# Patient Record
Sex: MALE | Race: WHITE | NOT HISPANIC OR LATINO | URBAN - NONMETROPOLITAN AREA
[De-identification: names, ages, dates, MRNs, and addresses within clinical notes are randomized per-mention and may not be internally consistent; named-entity substitution may affect disease eponyms.]

---

## 2021-03-26 ENCOUNTER — IMMUNIZATIONS (OUTPATIENT)
Dept: FAMILY MEDICINE CLINIC | Facility: HOSPITAL | Age: 51
End: 2021-03-26

## 2021-03-26 DIAGNOSIS — Z23 ENCOUNTER FOR IMMUNIZATION: Primary | ICD-10-CM

## 2021-03-26 PROCEDURE — 0011A SARS-COV-2 / COVID-19 MRNA VACCINE (MODERNA) 100 MCG: CPT

## 2021-03-26 PROCEDURE — 91301 SARS-COV-2 / COVID-19 MRNA VACCINE (MODERNA) 100 MCG: CPT

## 2021-04-28 ENCOUNTER — IMMUNIZATIONS (OUTPATIENT)
Dept: FAMILY MEDICINE CLINIC | Facility: HOSPITAL | Age: 51
End: 2021-04-28

## 2021-04-28 DIAGNOSIS — Z23 ENCOUNTER FOR IMMUNIZATION: Primary | ICD-10-CM

## 2021-04-28 PROCEDURE — 91301 SARS-COV-2 / COVID-19 MRNA VACCINE (MODERNA) 100 MCG: CPT

## 2021-04-28 PROCEDURE — 0012A SARS-COV-2 / COVID-19 MRNA VACCINE (MODERNA) 100 MCG: CPT

## 2023-04-26 ENCOUNTER — OFFICE VISIT (OUTPATIENT)
Dept: PODIATRY | Age: 53
End: 2023-04-26

## 2023-04-26 VITALS
HEART RATE: 100 BPM | BODY MASS INDEX: 47.74 KG/M2 | OXYGEN SATURATION: 92 % | DIASTOLIC BLOOD PRESSURE: 110 MMHG | TEMPERATURE: 97.4 F | WEIGHT: 315 LBS | SYSTOLIC BLOOD PRESSURE: 210 MMHG | HEIGHT: 68 IN

## 2023-04-26 DIAGNOSIS — L85.3 XEROSIS CUTIS: ICD-10-CM

## 2023-04-26 DIAGNOSIS — B35.1 ONYCHOMYCOSIS: Primary | ICD-10-CM

## 2023-04-26 DIAGNOSIS — I73.9 PVD (PERIPHERAL VASCULAR DISEASE) (HCC): ICD-10-CM

## 2023-04-26 DIAGNOSIS — E11.59 TYPE 2 DIABETES MELLITUS WITH OTHER CIRCULATORY COMPLICATION, WITHOUT LONG-TERM CURRENT USE OF INSULIN (HCC): ICD-10-CM

## 2023-04-26 RX ORDER — ALPRAZOLAM 0.25 MG/1
TABLET ORAL DAILY PRN
COMMUNITY
Start: 2023-03-09

## 2023-04-26 RX ORDER — IPRATROPIUM BROMIDE AND ALBUTEROL SULFATE 2.5; .5 MG/3ML; MG/3ML
3 SOLUTION RESPIRATORY (INHALATION) 4 TIMES DAILY PRN
COMMUNITY

## 2023-04-26 RX ORDER — ALBUTEROL SULFATE 90 UG/1
AEROSOL, METERED RESPIRATORY (INHALATION)
COMMUNITY
Start: 2023-04-24

## 2023-04-26 RX ORDER — AMMONIUM LACTATE 12 G/100G
CREAM TOPICAL 2 TIMES DAILY
Qty: 385 G | Refills: 3 | Status: SHIPPED | OUTPATIENT
Start: 2023-04-26

## 2023-04-26 RX ORDER — POTASSIUM CHLORIDE 20 MEQ/1
20 TABLET, EXTENDED RELEASE ORAL DAILY
COMMUNITY
Start: 2023-04-24

## 2023-04-26 RX ORDER — LEVOTHYROXINE SODIUM 0.2 MG/1
TABLET ORAL
COMMUNITY
Start: 2023-04-23

## 2023-04-26 RX ORDER — ERGOCALCIFEROL 1.25 MG/1
CAPSULE ORAL
COMMUNITY
Start: 2023-04-14

## 2023-04-26 RX ORDER — LEVOTHYROXINE SODIUM 0.03 MG/1
25 TABLET ORAL DAILY
COMMUNITY

## 2023-04-26 RX ORDER — CLINDAMYCIN AND BENZOYL PEROXIDE 10; 50 MG/G; MG/G
1 GEL TOPICAL 2 TIMES DAILY
COMMUNITY

## 2023-04-26 RX ORDER — CHOLECALCIFEROL (VITAMIN D3) 125 MCG
CAPSULE ORAL
COMMUNITY

## 2023-04-26 RX ORDER — METOPROLOL SUCCINATE 50 MG/1
50 TABLET, EXTENDED RELEASE ORAL EVERY EVENING
COMMUNITY
Start: 2023-04-24

## 2023-04-26 RX ORDER — CLONIDINE HYDROCHLORIDE 0.1 MG/1
0.1 TABLET ORAL DAILY PRN
COMMUNITY
Start: 2023-04-04

## 2023-04-26 RX ORDER — BENZOYL PEROXIDE 50 MG/ML
LIQUID TOPICAL
COMMUNITY
Start: 2023-03-09

## 2023-04-26 RX ORDER — ASPIRIN 81 MG/1
81 TABLET, COATED ORAL DAILY
COMMUNITY
Start: 2023-03-20

## 2023-04-26 RX ORDER — TRAZODONE HYDROCHLORIDE 100 MG/1
100 TABLET ORAL
COMMUNITY

## 2023-04-26 RX ORDER — CEPHALEXIN 500 MG/1
500 CAPSULE ORAL 3 TIMES DAILY
COMMUNITY
Start: 2023-04-24

## 2023-04-26 RX ORDER — PANTOPRAZOLE SODIUM 40 MG/1
TABLET, DELAYED RELEASE ORAL
COMMUNITY
Start: 2023-03-31

## 2023-04-26 RX ORDER — VALSARTAN 80 MG/1
80 TABLET ORAL DAILY
COMMUNITY
Start: 2023-01-17

## 2023-04-26 RX ORDER — IBUPROFEN 600 MG/1
600 TABLET ORAL 3 TIMES DAILY PRN
COMMUNITY
Start: 2023-04-24

## 2023-04-26 RX ORDER — ESCITALOPRAM OXALATE 5 MG/1
5 TABLET ORAL DAILY
COMMUNITY
Start: 2023-02-20

## 2023-04-26 RX ORDER — AMLODIPINE BESYLATE 10 MG/1
10 TABLET ORAL EVERY EVENING
COMMUNITY
Start: 2023-03-09

## 2023-04-26 RX ORDER — HYDROCHLOROTHIAZIDE 25 MG/1
25 TABLET ORAL DAILY
COMMUNITY
Start: 2023-04-16

## 2023-04-26 RX ORDER — CLINDAMYCIN PHOSPHATE 10 UG/ML
1 LOTION TOPICAL DAILY
COMMUNITY
Start: 2023-03-09

## 2023-04-26 RX ORDER — FUROSEMIDE 40 MG/1
40 TABLET ORAL DAILY
COMMUNITY
Start: 2023-04-12

## 2023-04-26 NOTE — PROGRESS NOTES
Assessment/Plan:     Diagnoses and all orders for this visit:    Onychomycosis    PVD (peripheral vascular disease) ([facility] )    Type 2 diabetes mellitus with other circulatory complication, without long-term current use of insulin ([facility])    Xerosis cutis  -     ammonium lactate (LAC-HYDRIN) 12 % cream; Apply topically 2 (two) times a day    Other orders  -     valsartan (DIOVAN) 80 mg tablet; Take 80 mg by mouth daily (Patient not taking: Reported on 4/26/2023)  -     umeclidinium-vilanterol 62 5-25 mcg/actuation inhaler;  (Patient not taking: Reported on 4/26/2023)  -     traZODone (DESYREL) 100 mg tablet; Take 100 mg by mouth (Patient not taking: Reported on 4/26/2023)  -     potassium chloride (K-DUR,KLOR-CON) 20 mEq tablet; Take 20 mEq by mouth daily  -     pantoprazole (PROTONIX) 40 mg tablet; take 1 tablet by mouth every morning 30 MINS BEFORE FIRST MEAL OF THE DAY  DO NOT CUT SPLIT OR CHEW (Patient not taking: Reported on 4/26/2023)  -     metoprolol succinate (TOPROL-XL) 50 mg 24 hr tablet; Take 50 mg by mouth every evening  -     metFORMIN (GLUCOPHAGE) 1000 MG tablet; Take 1,000 mg by mouth 2 (two) times a day  -     levothyroxine 25 mcg tablet; Take 25 mcg by mouth daily  -     levothyroxine 200 mcg tablet  -     ipratropium-albuterol (DUO-NEB) 0 5-2 5 mg/3 mL nebulizer solution; Inhale 3 mL 4 (four) times a day as needed  -     ibuprofen (MOTRIN) 600 mg tablet; Take 600 mg by mouth 3 (three) times a day as needed  -     hydrochlorothiazide (HYDRODIURIL) 25 mg tablet; Take 25 mg by mouth daily (Patient not taking: Reported on 4/26/2023)  -     furosemide (LASIX) 40 mg tablet; Take 40 mg by mouth daily  -     escitalopram (LEXAPRO) 5 mg tablet; Take 5 mg by mouth daily (Patient not taking: Reported on 4/26/2023)  -     ergocalciferol (VITAMIN D2) 50,000 units; take 1 capsule by mouth every week for 12 WEEKS  -     cloNIDine (CATAPRES) 0 1 mg tablet;  Take 0 1 mg by mouth daily as needed  -     clindamycin (CLEOCIN T) 1 % lotion; Apply 1 application  topically daily As directed  -     cephalexin (KEFLEX) 500 mg capsule; Take 500 mg by mouth 3 (three) times a day  -     clindamycin-benzoyl peroxide (BENZACLIN) gel; Apply 1 application  topically 2 (two) times a day  -     Aspirin Low Dose 81 MG EC tablet; Take 81 mg by mouth daily  -     amLODIPine (NORVASC) 10 mg tablet; Take 10 mg by mouth every evening (Patient not taking: Reported on 4/26/2023)  -     benzoyl peroxide 5 % external wash; APPLY LOCALLY DAILY AS NEEDED  -     ALPRAZolam (XANAX) 0 25 mg tablet; Take by mouth daily as needed  -     albuterol (PROVENTIL HFA,VENTOLIN HFA) 90 mcg/act inhaler; inhale 2 puffs by mouth and INTO THE LUNGS every 4 hours if needed  -     Melatonin 5 MG TABS; Take by mouth           IMPRESSION:  · NIDDM, A1c 7 8% 2/15/23  · CHF w/ B/L LE edema  · Onychomycosis  · Xerosis     PLAN:  · I reviewed clinical exam with patient in detail today  I have discussed with the patient the pathophysiology of this diagnosis and reviewed how the examination correlates with this diagnosis  DFE performed today  Patient has significant lower extremity risk due to diminished pulses in the feet and trophic skin changes to the lower extremity including thick toenail, atrophic skin, and decreased hair growth  Debridement of toenails x10 performed today  Using nail nipper, riaz, and curette, nails were sharply debrided, reduced in thickness and length  Devitalized nail tissue and fungal debris excised and removed  Patient tolerated well  Discussed proper shoe gear, daily inspections of feet, and general foot health with patient  Amlactin rx'd for BID use to heels  I discussed that he should follow-up as recommended for his heart issues  I reviewed [facility] visit from 4/17/23 and patient left AMA due to wife (unfortunately she passed)  He was placed on a diuretic and instructed to f/u for further care (further imaging)   I discussed that there are "things that are able to be done to decrease B/L LE edema however he needs to get his heart checked out first  I recommended LE elevation in the meantime  Patient has Q8  findings and is recommended for at risk foot care every 9-10 weeks  Subjective:      Patient ID: Teodoro Luther is a 50 y o  male  Ijeoma Serrato presents to clinic today regarding need for diabetic foot check, nail care and B/L LE including feet edema and pain  Notes pain, edema present for a few months  He recently went to ED for right foot pain and full workup was started for CHF however patient left AMA due to wife not doing well  Notes painful edema to B/L LE  NIDDM  Endorses T/B to B/L LE  The following portions of the patient's history were reviewed and updated as appropriate: allergies, current medications, past family history, past medical history, past social history, past surgical history and problem list     Review of Systems   Constitutional: Negative for activity change, chills and fever  HENT: Negative  Respiratory: Negative for cough, chest tightness and shortness of breath  Cardiovascular: Positive for leg swelling (B/L)  Negative for chest pain  Endocrine: Negative  Genitourinary: Negative  Musculoskeletal: Positive for gait problem (B/L LE pain)  Skin:        dystrophic toenails   Neurological:        T/B to B/L feet   Psychiatric/Behavioral: Negative  Negative for agitation and behavioral problems  Objective:      BP (!) 210/110 (BP Location: Right arm, Patient Position: Sitting, Cuff Size: Standard)   Pulse 100   Temp (!) 97 4 °F (36 3 °C)   Ht 5' 8\" (1 727 m)   Wt (!) 143 kg (315 lb)   SpO2 92%   BMI 47 90 kg/m²          Physical Exam  Cardiovascular:      Pulses: Pulses are weak  Dorsalis pedis pulses are 0 on the right side and 0 on the left side  Posterior tibial pulses are 0 on the right side and 0 on the left side        Comments: B/L LE with edema from below " knee to toes  There are varicosities noted  Feet:      Right foot:      Skin integrity: Dry skin (heels) present  No ulcer or callus  Left foot:      Skin integrity: Dry skin (heels) present  No ulcer or callus  Skin:     Comments: Toenails x10 are elongated, thickened with yellow discoloration and subungual debris  B/L LE skin is thin, dry and shiny  No open wounds however skin is with venous stasis changes  Neurological:      Comments: + T/B to B/L LE           Diabetic Foot Exam    Patient's shoes and socks removed  Right Foot/Ankle   Right Foot Inspection  Skin Exam: skin intact and dry skin (heels)  No callus, no ulcer and no callus  Toe Exam:  no right toe deformity    Sensory   Vibration: diminished  Proprioception: diminished  Monofilament testing: intact    Vascular  Capillary refills: < 3 seconds  The right DP pulse is 0  The right PT pulse is 0  Left Foot/Ankle  Left Foot Inspection  Skin Exam: skin intact and dry skin (heels)  No ulcer and no callus  Toe Exam: No left toe deformity  Sensory   Vibration: diminished  Proprioception: diminished  Monofilament testing: intact    Vascular  Capillary refills: < 3 seconds  The left DP pulse is 0  The left PT pulse is 0       Assign Risk Category  No deformity present  No loss of protective sensation  Weak pulses  Risk: 0

## 2023-05-30 ENCOUNTER — OFFICE VISIT (OUTPATIENT)
Dept: URGENT CARE | Facility: CLINIC | Age: 53
End: 2023-05-30

## 2023-05-30 VITALS
TEMPERATURE: 97.5 F | SYSTOLIC BLOOD PRESSURE: 158 MMHG | WEIGHT: 315 LBS | BODY MASS INDEX: 47.74 KG/M2 | HEIGHT: 68 IN | DIASTOLIC BLOOD PRESSURE: 100 MMHG | RESPIRATION RATE: 20 BRPM | OXYGEN SATURATION: 95 % | HEART RATE: 105 BPM

## 2023-05-30 DIAGNOSIS — S91.115A LACERATION OF FIFTH TOE OF LEFT FOOT, INITIAL ENCOUNTER: Primary | ICD-10-CM

## 2023-05-30 RX ORDER — CEPHALEXIN 500 MG/1
500 CAPSULE ORAL EVERY 8 HOURS SCHEDULED
Qty: 21 CAPSULE | Refills: 0 | Status: SHIPPED | OUTPATIENT
Start: 2023-05-30 | End: 2023-06-06

## 2023-05-30 NOTE — PROGRESS NOTES
[facility]        NAME: Ariella Esteban is a 50 y o  male  : 1970    MRN: [id number]  DATE: May 30, 2023  TIME: 4:29 PM    Assessment and Plan   Laceration of fifth toe of left foot, initial encounter [S91 115A]  1  Laceration of fifth toe of left foot, initial encounter  Tdap Vaccine greater than or equal to 8yo    cephalexin (KEFLEX) 500 mg capsule        Discussed problem with patient  Laceration fifth toe seems well-appearing and prescribing Keflex due to diabetic history as well as peripheral vascular disease history  Discussed that patient has +3 pitting edema as well as syncopal history and advised that he needs follow-up for these issues  Patient reports that he has a PCP appointment with Dr Noris Tavarez tomorrow  Advised to address these concerns with him tomorrow  Advised good wound cleansing using soap and water as well as bacitracin ointment  Patient Instructions       Follow up with PCP in 3-5 days  Proceed to  ER if symptoms worsen  Chief Complaint     Chief Complaint   Patient presents with   • Toe Injury     [place] onto right knee 1 day ago and sustained a laceration to his left 5 th toe  History of Present Illness       51 y/o male presents after a fall the occurred yesterday  Has been pasting out standing up for the last 2 months  Reports patient experienced one of these episodes yesterday and fell onto right knee and suffered a laceration to the left 5th toe  Past medical history of peripheral vascular disease, CHF, type 2 diabetes, COPD, tobacco abuse, hypertension  patient reports 5 out of 10 pain complaints to his right knee but has full range of motion  Walks with a cane at baseline  States he does not drive  Denies any numbness or tingling from the right knee  Patient reports he his compliant on his medications but er report from  states he is not usually compliant        Review of Systems   Review of Systems   Constitutional: Negative for appetite change, chills, fatigue and fever  Eyes: Negative for photophobia and visual disturbance  Respiratory: Negative for cough, shortness of breath, wheezing and stridor  Cardiovascular: Negative for chest pain and palpitations  Skin: Positive for wound  Neurological: Positive for dizziness and light-headedness  Negative for syncope and headaches           Current Medications       Current Outpatient Medications:   •  albuterol (PROVENTIL HFA,VENTOLIN HFA) 90 mcg/act inhaler, inhale 2 puffs by mouth and INTO THE LUNGS every 4 hours if needed, Disp: , Rfl:   •  ALPRAZolam (XANAX) 0 25 mg tablet, Take by mouth daily as needed, Disp: , Rfl:   •  amLODIPine (NORVASC) 10 mg tablet, Take 10 mg by mouth every evening, Disp: , Rfl:   •  ammonium lactate (LAC-HYDRIN) 12 % cream, Apply topically 2 (two) times a day, Disp: 385 g, Rfl: 3  •  Aspirin Low Dose 81 MG EC tablet, Take 81 mg by mouth daily, Disp: , Rfl:   •  benzoyl peroxide 5 % external wash, APPLY LOCALLY DAILY AS NEEDED, Disp: , Rfl:   •  cephalexin (KEFLEX) 500 mg capsule, Take 1 capsule (500 mg total) by mouth every 8 (eight) hours for 7 days, Disp: 21 capsule, Rfl: 0  •  cloNIDine (CATAPRES) 0 1 mg tablet, Take 0 1 mg by mouth daily as needed, Disp: , Rfl:   •  ergocalciferol (VITAMIN D2) 50,000 units, take 1 capsule by mouth every week for 12 WEEKS, Disp: , Rfl:   •  escitalopram (LEXAPRO) 5 mg tablet, Take 5 mg by mouth daily, Disp: , Rfl:   •  furosemide (LASIX) 40 mg tablet, Take 40 mg by mouth daily, Disp: , Rfl:   •  ibuprofen (MOTRIN) 600 mg tablet, Take 600 mg by mouth 3 (three) times a day as needed, Disp: , Rfl:   •  ipratropium-albuterol (DUO-NEB) 0 5-2 5 mg/3 mL nebulizer solution, Inhale 3 mL 4 (four) times a day as needed, Disp: , Rfl:   •  levothyroxine 200 mcg tablet, , Disp: , Rfl:   •  levothyroxine 25 mcg tablet, Take 25 mcg by mouth daily, Disp: , Rfl:   •  Melatonin 5 MG TABS, Take by mouth, Disp: , Rfl:   •  metFORMIN (GLUCOPHAGE) 1000 MG tablet, Take 1,000 mg by mouth 2 (two) times a day, Disp: , Rfl:   •  metoprolol succinate (TOPROL-XL) 50 mg 24 hr tablet, Take 50 mg by mouth every evening, Disp: , Rfl:   •  potassium chloride (K-DUR,KLOR-CON) 20 mEq tablet, Take 20 mEq by mouth daily, Disp: , Rfl:   •  clindamycin (CLEOCIN T) 1 % lotion, Apply 1 application  topically daily As directed, Disp: , Rfl:   •  clindamycin-benzoyl peroxide (BENZACLIN) gel, Apply 1 application  topically 2 (two) times a day, Disp: , Rfl:   •  hydrochlorothiazide (HYDRODIURIL) 25 mg tablet, Take 25 mg by mouth daily (Patient not taking: Reported on 4/26/2023), Disp: , Rfl:   •  pantoprazole (PROTONIX) 40 mg tablet, take 1 tablet by mouth every morning 30 MINS BEFORE FIRST MEAL OF THE DAY  DO NOT CUT SPLIT OR CHEW (Patient not taking: Reported on 4/26/2023), Disp: , Rfl:   •  traZODone (DESYREL) 100 mg tablet, Take 100 mg by mouth (Patient not taking: Reported on 4/26/2023), Disp: , Rfl:   •  umeclidinium-vilanterol 62 5-25 mcg/actuation inhaler, , Disp: , Rfl:   •  valsartan (DIOVAN) 80 mg tablet, Take 80 mg by mouth daily (Patient not taking: Reported on 4/26/2023), Disp: , Rfl:     Current Allergies     Allergies as of 05/30/2023   • (No Known Allergies)            The following portions of the patient's history were reviewed and updated as appropriate: allergies, current medications, past family history, past medical history, past social history, past surgical history and problem list      Past Medical History:   Diagnosis Date   • COPD (chronic obstructive pulmonary disease) ([facility] )    • Diabetes mellitus ([facility] )    • GERD (gastroesophageal reflux disease)    • Hyperlipidemia    • Hypertension        Past Surgical History:   Procedure Laterality Date   • FISTULA REPAIR         Family History   Problem Relation Age of Onset   • Hypertension Mother    • Hypertension Father          Medications have been verified          Objective   /100   Pulse 105   Temp "97 5 °F (36 4 °C)   Resp 20   Ht 5' 8\" (1 727 m)   Wt (!) 143 kg (315 lb)   SpO2 95%   BMI 47 90 kg/m²        Physical Exam     Physical Exam  Vitals and nursing note reviewed  Constitutional:       General: He is not in acute distress  Appearance: Normal appearance  He is normal weight  He is not ill-appearing, toxic-appearing or diaphoretic  HENT:      Head: Normocephalic  Cardiovascular:      Rate and Rhythm: Normal rate and regular rhythm  Pulses: Normal pulses  Heart sounds: Normal heart sounds  No murmur heard  No friction rub  No gallop  Pulmonary:      Effort: Pulmonary effort is normal  No respiratory distress  Breath sounds: Normal breath sounds  No stridor  No wheezing, rhonchi or rales  Chest:      Chest wall: No tenderness  Musculoskeletal:      Right knee: No bony tenderness  Normal range of motion  Tenderness present over the medial joint line and lateral joint line  No LCL laxity, MCL laxity, ACL laxity or PCL laxity  Right lower leg: 3+ Pitting Edema present  Left lower leg: 3+ Pitting Edema present  Comments: Diffuse pain complaints to right knee  Patient has full range of motion using right knee but states it feels like \" giving out\" after taking multiple steps  No overlying bruising, swelling, deformity  Walks with a cane at baseline  Feet:      Comments: 2 cm laceration to left fifth toe  Currently scabbed over and no signs of erythema, swelling, purulence  Skin:     Capillary Refill: Capillary refill takes less than 2 seconds  Neurological:      General: No focal deficit present  Mental Status: He is alert     Psychiatric:         Mood and Affect: Mood normal          Behavior: Behavior normal                    "

## 2023-06-01 ENCOUNTER — TELEPHONE (OUTPATIENT)
Dept: PULMONOLOGY | Facility: CLINIC | Age: 53
End: 2023-06-01

## 2023-06-22 DIAGNOSIS — E66.01 MORBID (SEVERE) OBESITY DUE TO EXCESS CALORIES (HCC): ICD-10-CM

## 2023-06-22 DIAGNOSIS — Z87.891 PERSONAL HISTORY OF NICOTINE DEPENDENCE: ICD-10-CM

## 2023-06-22 DIAGNOSIS — E78.5 HYPERLIPIDEMIA, UNSPECIFIED: ICD-10-CM

## 2023-06-22 DIAGNOSIS — I25.10 ATHEROSCLEROTIC HEART DISEASE OF NATIVE CORONARY ARTERY WITHOUT ANGINA PECTORIS: ICD-10-CM

## 2023-06-22 DIAGNOSIS — I50.31 ACUTE DIASTOLIC (CONGESTIVE) HEART FAILURE (HCC): ICD-10-CM

## 2023-06-22 DIAGNOSIS — I25.84 CORONARY ATHEROSCLEROSIS DUE TO CALCIFIED CORONARY LESION (CODE): ICD-10-CM

## 2023-06-22 DIAGNOSIS — I10 ESSENTIAL (PRIMARY) HYPERTENSION: ICD-10-CM

## 2023-06-22 DIAGNOSIS — J44.9 CHRONIC OBSTRUCTIVE PULMONARY DISEASE, UNSPECIFIED (HCC): ICD-10-CM

## 2023-06-22 DIAGNOSIS — E11.9 TYPE 2 DIABETES MELLITUS WITHOUT COMPLICATIONS (HCC): ICD-10-CM

## 2023-07-09 ENCOUNTER — TELEPHONE (OUTPATIENT)
Dept: OTHER | Facility: OTHER | Age: 53
End: 2023-07-09

## 2023-07-09 NOTE — TELEPHONE ENCOUNTER
Patient called and canceled his appointment because he is not feeling well. Patient is asking if the office can give him back a call to reschedule his appointment.

## 2024-04-05 ENCOUNTER — RX ONLY (RX ONLY)
Age: 54
End: 2024-04-05

## 2024-04-05 ENCOUNTER — DOCTOR'S OFFICE (OUTPATIENT)
Dept: URBAN - NONMETROPOLITAN AREA CLINIC 1 | Facility: CLINIC | Age: 54
Setting detail: OPHTHALMOLOGY
End: 2024-04-05
Payer: COMMERCIAL

## 2024-04-05 DIAGNOSIS — H40.053: ICD-10-CM

## 2024-04-05 DIAGNOSIS — E11.9: ICD-10-CM

## 2024-04-05 DIAGNOSIS — Z79.899: ICD-10-CM

## 2024-04-05 PROBLEM — H52.4 PRESBYOPIA: Status: ACTIVE | Noted: 2024-04-05

## 2024-04-05 PROBLEM — H52.03 HYPERMETROPIA; BOTH EYES: Status: ACTIVE | Noted: 2024-04-05

## 2024-04-05 PROCEDURE — 92202 OPSCPY EXTND ON/MAC DRAW: CPT

## 2024-04-05 PROCEDURE — 92004 COMPRE OPH EXAM NEW PT 1/>: CPT

## 2024-04-05 PROCEDURE — 76514 ECHO EXAM OF EYE THICKNESS: CPT

## 2024-04-05 PROCEDURE — 92133 CPTRZD OPH DX IMG PST SGM ON: CPT
